# Patient Record
Sex: FEMALE | Race: WHITE | Employment: UNEMPLOYED | URBAN - METROPOLITAN AREA
[De-identification: names, ages, dates, MRNs, and addresses within clinical notes are randomized per-mention and may not be internally consistent; named-entity substitution may affect disease eponyms.]

---

## 2024-03-12 ENCOUNTER — APPOINTMENT (OUTPATIENT)
Dept: CT IMAGING | Age: 34
End: 2024-03-12
Payer: COMMERCIAL

## 2024-03-12 ENCOUNTER — HOSPITAL ENCOUNTER (EMERGENCY)
Age: 34
Discharge: HOME | End: 2024-03-12
Attending: EMERGENCY MEDICINE
Payer: COMMERCIAL

## 2024-03-12 VITALS
WEIGHT: 158 LBS | DIASTOLIC BLOOD PRESSURE: 78 MMHG | HEART RATE: 80 BPM | HEIGHT: 62 IN | TEMPERATURE: 98 F | RESPIRATION RATE: 16 BRPM | SYSTOLIC BLOOD PRESSURE: 119 MMHG | BODY MASS INDEX: 29.08 KG/M2 | OXYGEN SATURATION: 98 %

## 2024-03-12 DIAGNOSIS — R51.9 ACUTE NONINTRACTABLE HEADACHE, UNSPECIFIED HEADACHE TYPE: Primary | ICD-10-CM

## 2024-03-12 LAB
ALBUMIN SERPL-MCNC: 3.7 G/DL (ref 3.5–5.2)
ALP SERPL-CCNC: 77 U/L (ref 35–104)
ALT SERPL-CCNC: 18 U/L (ref 5–33)
ANION GAP SERPL CALCULATED.3IONS-SCNC: 8 MMOL/L (ref 9–17)
AST SERPL-CCNC: 23 U/L
BASOPHILS # BLD: <0.03 K/UL (ref 0–0.2)
BASOPHILS NFR BLD: 0 % (ref 0–2)
BILIRUB DIRECT SERPL-MCNC: <0.1 MG/DL
BILIRUB INDIRECT SERPL-MCNC: ABNORMAL MG/DL (ref 0–1)
BILIRUB SERPL-MCNC: 0.2 MG/DL (ref 0.3–1.2)
BUN SERPL-MCNC: 19 MG/DL (ref 6–20)
BUN/CREAT SERPL: 38 (ref 9–20)
CALCIUM SERPL-MCNC: 8.1 MG/DL (ref 8.6–10.4)
CHLORIDE SERPL-SCNC: 105 MMOL/L (ref 98–107)
CK SERPL-CCNC: 109 U/L (ref 26–192)
CO2 SERPL-SCNC: 25 MMOL/L (ref 20–31)
CREAT SERPL-MCNC: 0.5 MG/DL (ref 0.5–0.9)
EOSINOPHIL # BLD: 0.13 K/UL (ref 0–0.44)
EOSINOPHILS RELATIVE PERCENT: 3 % (ref 1–4)
ERYTHROCYTE [DISTWIDTH] IN BLOOD BY AUTOMATED COUNT: 18.5 % (ref 11.8–14.4)
FLUAV RNA RESP QL NAA+PROBE: NOT DETECTED
FLUBV RNA RESP QL NAA+PROBE: NOT DETECTED
GFR SERPL CREATININE-BSD FRML MDRD: >60 ML/MIN/1.73M2
GLUCOSE SERPL-MCNC: 92 MG/DL (ref 70–99)
HCG SERPL QL: NEGATIVE
HCT VFR BLD AUTO: 32.7 % (ref 36.3–47.1)
HGB BLD-MCNC: 9.5 G/DL (ref 11.9–15.1)
IMM GRANULOCYTES # BLD AUTO: 0.01 K/UL (ref 0–0.3)
IMM GRANULOCYTES NFR BLD: 0 %
LYMPHOCYTES NFR BLD: 1.85 K/UL (ref 1.1–3.7)
LYMPHOCYTES RELATIVE PERCENT: 39 % (ref 24–43)
MCH RBC QN AUTO: 23.5 PG (ref 25.2–33.5)
MCHC RBC AUTO-ENTMCNC: 29.1 G/DL (ref 28.4–34.8)
MCV RBC AUTO: 80.7 FL (ref 82.6–102.9)
MONOCYTES NFR BLD: 0.49 K/UL (ref 0.1–1.2)
MONOCYTES NFR BLD: 10 % (ref 3–12)
MYOGLOBIN SERPL-MCNC: <21 NG/ML (ref 25–58)
NEUTROPHILS NFR BLD: 48 % (ref 36–65)
NEUTS SEG NFR BLD: 2.2 K/UL (ref 1.5–8.1)
NRBC BLD-RTO: 0 PER 100 WBC
PLATELET # BLD AUTO: 355 K/UL (ref 138–453)
PMV BLD AUTO: 9 FL (ref 8.1–13.5)
POTASSIUM SERPL-SCNC: 4.1 MMOL/L (ref 3.7–5.3)
PROT SERPL-MCNC: 6.9 G/DL (ref 6.4–8.3)
RBC # BLD AUTO: 4.05 M/UL (ref 3.95–5.11)
RBC # BLD: ABNORMAL 10*6/UL
SARS-COV-2 RNA RESP QL NAA+PROBE: NOT DETECTED
SODIUM SERPL-SCNC: 138 MMOL/L (ref 135–144)
SOURCE: NORMAL
SPECIMEN DESCRIPTION: NORMAL
TSH SERPL DL<=0.05 MIU/L-ACNC: 1.49 UIU/ML (ref 0.3–5)
WBC OTHER # BLD: 4.7 K/UL (ref 3.5–11.3)

## 2024-03-12 PROCEDURE — 93005 ELECTROCARDIOGRAM TRACING: CPT | Performed by: EMERGENCY MEDICINE

## 2024-03-12 PROCEDURE — 80048 BASIC METABOLIC PNL TOTAL CA: CPT

## 2024-03-12 PROCEDURE — 80076 HEPATIC FUNCTION PANEL: CPT

## 2024-03-12 PROCEDURE — 87636 SARSCOV2 & INF A&B AMP PRB: CPT

## 2024-03-12 PROCEDURE — 84443 ASSAY THYROID STIM HORMONE: CPT

## 2024-03-12 PROCEDURE — 83874 ASSAY OF MYOGLOBIN: CPT

## 2024-03-12 PROCEDURE — 82550 ASSAY OF CK (CPK): CPT

## 2024-03-12 PROCEDURE — 6360000002 HC RX W HCPCS: Performed by: EMERGENCY MEDICINE

## 2024-03-12 PROCEDURE — 99284 EMERGENCY DEPT VISIT MOD MDM: CPT

## 2024-03-12 PROCEDURE — 70450 CT HEAD/BRAIN W/O DYE: CPT

## 2024-03-12 PROCEDURE — 84703 CHORIONIC GONADOTROPIN ASSAY: CPT

## 2024-03-12 PROCEDURE — 96374 THER/PROPH/DIAG INJ IV PUSH: CPT

## 2024-03-12 PROCEDURE — 85025 COMPLETE CBC W/AUTO DIFF WBC: CPT

## 2024-03-12 RX ORDER — KETOROLAC TROMETHAMINE 30 MG/ML
30 INJECTION, SOLUTION INTRAMUSCULAR; INTRAVENOUS ONCE
Status: COMPLETED | OUTPATIENT
Start: 2024-03-12 | End: 2024-03-12

## 2024-03-12 RX ORDER — PRAZOSIN HYDROCHLORIDE 2 MG/1
2 CAPSULE ORAL NIGHTLY
COMMUNITY

## 2024-03-12 RX ADMIN — KETOROLAC TROMETHAMINE 30 MG: 30 INJECTION, SOLUTION INTRAMUSCULAR at 11:58

## 2024-03-12 ASSESSMENT — ENCOUNTER SYMPTOMS
NAUSEA: 0
EYE REDNESS: 0
SORE THROAT: 0
RHINORRHEA: 0
VOMITING: 0
SHORTNESS OF BREATH: 0
EYE DISCHARGE: 0
COUGH: 0
COLOR CHANGE: 0
DIARRHEA: 0

## 2024-03-12 ASSESSMENT — PAIN DESCRIPTION - LOCATION: LOCATION: HEAD

## 2024-03-12 ASSESSMENT — PAIN DESCRIPTION - FREQUENCY: FREQUENCY: CONTINUOUS

## 2024-03-12 ASSESSMENT — PAIN DESCRIPTION - DESCRIPTORS: DESCRIPTORS: PRESSURE

## 2024-03-12 ASSESSMENT — PAIN - FUNCTIONAL ASSESSMENT: PAIN_FUNCTIONAL_ASSESSMENT: 0-10

## 2024-03-12 ASSESSMENT — PAIN SCALES - GENERAL: PAINLEVEL_OUTOF10: 7

## 2024-03-12 NOTE — ED PROVIDER NOTES
unless otherwise noted in MDM or here.  Reports below are reviewed by myself.  CT HEAD WO CONTRAST   Final Result   No acute intracranial findings.             LABS: Lab orders shown below, the results are reviewed by myself, and all abnormals are listed below.  Labs Reviewed   BASIC METABOLIC PANEL - Abnormal; Notable for the following components:       Result Value    Anion Gap 8 (*)     Bun/Cre Ratio 38 (*)     Calcium 8.1 (*)     All other components within normal limits   CBC WITH AUTO DIFFERENTIAL - Abnormal; Notable for the following components:    Hemoglobin 9.5 (*)     Hematocrit 32.7 (*)     MCV 80.7 (*)     MCH 23.5 (*)     RDW 18.5 (*)     All other components within normal limits   HEPATIC FUNCTION PANEL - Abnormal; Notable for the following components:    Total Bilirubin 0.2 (*)     All other components within normal limits   MYOGLOBIN, BLOOD - Abnormal; Notable for the following components:    Myoglobin <21 (*)     All other components within normal limits   COVID-19 & INFLUENZA COMBO   TSH WITH REFLEX   HCG, SERUM, QUALITATIVE   CK       Vitals Reviewed:    Vitals:    03/12/24 1001   BP: 119/78   Pulse: 80   Resp: 16   Temp: 98 °F (36.7 °C)   TempSrc: Oral   SpO2: 98%   Weight: 71.7 kg (158 lb)   Height: 1.575 m (5' 2\")     MEDICATIONS GIVEN TO PATIENT THIS ENCOUNTER:  Orders Placed This Encounter   Medications    ketorolac (TORADOL) injection 30 mg     DISCHARGE PRESCRIPTIONS:  New Prescriptions    No medications on file     PHYSICIAN CONSULTS ORDERED THIS ENCOUNTER:  None  FINAL IMPRESSION      1. Acute nonintractable headache, unspecified headache type          DISPOSITION/PLAN   DISPOSITION Decision To Discharge 03/12/2024 12:09:58 PM      OUTPATIENT FOLLOW UP THE PATIENT:  [facility]  [place] [facility] [address]  [phone]  Schedule an appointment as soon as possible for a visit in 2 days        MD Jayjay Birmingham Rory T, MD  03/12/24 4029

## 2024-03-12 NOTE — ED NOTES
Dr Seivlla notified patient wanted a follow up with a neurologist, D/C updated and follow information given to patient to follow up with neurologist.

## 2024-03-13 ENCOUNTER — OFFICE VISIT (OUTPATIENT)
Dept: NEUROSURGERY | Age: 34
End: 2024-03-13

## 2024-03-13 VITALS
HEART RATE: 78 BPM | SYSTOLIC BLOOD PRESSURE: 117 MMHG | HEIGHT: 62 IN | WEIGHT: 158 LBS | BODY MASS INDEX: 29.08 KG/M2 | DIASTOLIC BLOOD PRESSURE: 76 MMHG

## 2024-03-13 DIAGNOSIS — Z86.69 HISTORY OF CHIARI MALFORMATION: ICD-10-CM

## 2024-03-13 DIAGNOSIS — G93.5 CHIARI MALFORMATION TYPE I (HCC): Primary | ICD-10-CM

## 2024-03-13 LAB
EKG ATRIAL RATE: 76 BPM
EKG P AXIS: 67 DEGREES
EKG P-R INTERVAL: 144 MS
EKG Q-T INTERVAL: 400 MS
EKG QRS DURATION: 94 MS
EKG QTC CALCULATION (BAZETT): 450 MS
EKG R AXIS: 72 DEGREES
EKG T AXIS: 49 DEGREES
EKG VENTRICULAR RATE: 76 BPM

## 2024-03-13 NOTE — PROGRESS NOTES
demonstrate  no acute abnormality.     SOFT TISSUES/SKULL:  No acute abnormality of the visualized skull or soft  tissues.  There has been a prior occipital craniectomy.     IMPRESSION:  No acute intracranial findings.    Emergency Room Notes Reviewed      Assessment and Plan:      1. Chiari malformation type I (HCC)    2. History of Chiari malformation          Plan: Patient is having posterior headaches associated with straining. She has a history of Chiari malformation, with decompressive craniectomy in 2020.  The symptoms were brought on while she was weight lifting.  Cervical MRI and MRI CSF Flow study ordered.  Follow up with surgeon.      Followup: Return in about 4 weeks (around 4/10/2024), or if symptoms worsen or fail to improve.    Prescriptions Ordered:  No orders of the defined types were placed in this encounter.     Orders Placed:  Orders Placed This Encounter   Procedures    MRI CERVICAL SPINE WO CONTRAST     Standing Status:   Future     Standing Expiration Date:   3/13/2025    MRI CSF FLOW STUDY     Standing Status:   Future     Standing Expiration Date:   3/13/2025        Electronically signed by TOMASA Brown CNP on 3/13/2024 at 2:58 PM    Please note that this chart was generated using voice recognition Dragon dictation software.  Although every effort was made to ensure the accuracy of this automated transcription, some errors in transcription may have occurred.

## 2024-03-30 ENCOUNTER — HOSPITAL ENCOUNTER (OUTPATIENT)
Dept: MRI IMAGING | Age: 34
End: 2024-03-30
Payer: COMMERCIAL

## 2024-03-30 DIAGNOSIS — Z86.69 HISTORY OF CHIARI MALFORMATION: ICD-10-CM

## 2024-03-30 DIAGNOSIS — G93.5 CHIARI MALFORMATION TYPE I (HCC): ICD-10-CM

## 2024-03-30 PROCEDURE — 70551 MRI BRAIN STEM W/O DYE: CPT

## 2024-03-30 PROCEDURE — 72141 MRI NECK SPINE W/O DYE: CPT

## 2024-04-16 ENCOUNTER — HOSPITAL ENCOUNTER (EMERGENCY)
Age: 34
Discharge: HOME | End: 2024-04-16
Attending: EMERGENCY MEDICINE
Payer: COMMERCIAL

## 2024-04-16 VITALS
HEART RATE: 98 BPM | WEIGHT: 158 LBS | DIASTOLIC BLOOD PRESSURE: 64 MMHG | TEMPERATURE: 98.4 F | SYSTOLIC BLOOD PRESSURE: 117 MMHG | OXYGEN SATURATION: 98 % | BODY MASS INDEX: 28.9 KG/M2 | RESPIRATION RATE: 12 BRPM

## 2024-04-16 DIAGNOSIS — B34.9 VIRAL SYNDROME: Primary | ICD-10-CM

## 2024-04-16 LAB
ALBUMIN SERPL-MCNC: 3.9 G/DL (ref 3.5–5.2)
ALP SERPL-CCNC: 75 U/L (ref 35–104)
ALT SERPL-CCNC: 14 U/L (ref 5–33)
ANION GAP SERPL CALCULATED.3IONS-SCNC: 11 MMOL/L (ref 9–17)
AST SERPL-CCNC: 19 U/L
BASOPHILS # BLD: <0.03 K/UL (ref 0–0.2)
BASOPHILS NFR BLD: 0 % (ref 0–2)
BILIRUB SERPL-MCNC: 0.3 MG/DL (ref 0.3–1.2)
BUN SERPL-MCNC: 19 MG/DL (ref 6–20)
BUN/CREAT SERPL: 32 (ref 9–20)
CALCIUM SERPL-MCNC: 8.1 MG/DL (ref 8.6–10.4)
CHLORIDE SERPL-SCNC: 103 MMOL/L (ref 98–107)
CO2 SERPL-SCNC: 20 MMOL/L (ref 20–31)
CREAT SERPL-MCNC: 0.6 MG/DL (ref 0.5–0.9)
EOSINOPHIL # BLD: 0.03 K/UL (ref 0–0.44)
EOSINOPHILS RELATIVE PERCENT: 0 % (ref 1–4)
ERYTHROCYTE [DISTWIDTH] IN BLOOD BY AUTOMATED COUNT: 17.1 % (ref 11.8–14.4)
FLUAV RNA RESP QL NAA+PROBE: NOT DETECTED
FLUBV RNA RESP QL NAA+PROBE: NOT DETECTED
GFR SERPL CREATININE-BSD FRML MDRD: >90 ML/MIN/1.73M2
GLUCOSE SERPL-MCNC: 101 MG/DL (ref 70–99)
HCT VFR BLD AUTO: 35.4 % (ref 36.3–47.1)
HGB BLD-MCNC: 10.7 G/DL (ref 11.9–15.1)
IMM GRANULOCYTES # BLD AUTO: 0.02 K/UL (ref 0–0.3)
IMM GRANULOCYTES NFR BLD: 0 %
LYMPHOCYTES NFR BLD: 0.86 K/UL (ref 1.1–3.7)
LYMPHOCYTES RELATIVE PERCENT: 13 % (ref 24–43)
MCH RBC QN AUTO: 23.4 PG (ref 25.2–33.5)
MCHC RBC AUTO-ENTMCNC: 30.2 G/DL (ref 28.4–34.8)
MCV RBC AUTO: 77.5 FL (ref 82.6–102.9)
MONOCYTES NFR BLD: 0.45 K/UL (ref 0.1–1.2)
MONOCYTES NFR BLD: 7 % (ref 3–12)
NEUTROPHILS NFR BLD: 80 % (ref 36–65)
NEUTS SEG NFR BLD: 5.43 K/UL (ref 1.5–8.1)
NRBC BLD-RTO: 0 PER 100 WBC
PLATELET # BLD AUTO: 372 K/UL (ref 138–453)
PMV BLD AUTO: 9.1 FL (ref 8.1–13.5)
POTASSIUM SERPL-SCNC: 4.1 MMOL/L (ref 3.7–5.3)
PROT SERPL-MCNC: 7.5 G/DL (ref 6.4–8.3)
RBC # BLD AUTO: 4.57 M/UL (ref 3.95–5.11)
RBC # BLD: ABNORMAL 10*6/UL
SARS-COV-2 RNA RESP QL NAA+PROBE: NOT DETECTED
SODIUM SERPL-SCNC: 134 MMOL/L (ref 135–144)
SOURCE: NORMAL
SPECIMEN DESCRIPTION: NORMAL
WBC OTHER # BLD: 6.8 K/UL (ref 3.5–11.3)

## 2024-04-16 PROCEDURE — 87636 SARSCOV2 & INF A&B AMP PRB: CPT

## 2024-04-16 PROCEDURE — 99284 EMERGENCY DEPT VISIT MOD MDM: CPT

## 2024-04-16 PROCEDURE — 6370000000 HC RX 637 (ALT 250 FOR IP): Performed by: EMERGENCY MEDICINE

## 2024-04-16 PROCEDURE — 80053 COMPREHEN METABOLIC PANEL: CPT

## 2024-04-16 PROCEDURE — 96374 THER/PROPH/DIAG INJ IV PUSH: CPT

## 2024-04-16 PROCEDURE — 96375 TX/PRO/DX INJ NEW DRUG ADDON: CPT

## 2024-04-16 PROCEDURE — 85025 COMPLETE CBC W/AUTO DIFF WBC: CPT

## 2024-04-16 PROCEDURE — 2580000003 HC RX 258: Performed by: EMERGENCY MEDICINE

## 2024-04-16 PROCEDURE — 6360000002 HC RX W HCPCS: Performed by: EMERGENCY MEDICINE

## 2024-04-16 RX ORDER — ONDANSETRON 4 MG/1
4 TABLET, ORALLY DISINTEGRATING ORAL 3 TIMES DAILY PRN
Qty: 21 TABLET | Refills: 0 | Status: SHIPPED | OUTPATIENT
Start: 2024-04-16

## 2024-04-16 RX ORDER — LORAZEPAM 2 MG/ML
1 INJECTION INTRAMUSCULAR ONCE
Status: COMPLETED | OUTPATIENT
Start: 2024-04-16 | End: 2024-04-16

## 2024-04-16 RX ORDER — ONDANSETRON 2 MG/ML
4 INJECTION INTRAMUSCULAR; INTRAVENOUS ONCE
Status: COMPLETED | OUTPATIENT
Start: 2024-04-16 | End: 2024-04-16

## 2024-04-16 RX ORDER — IBUPROFEN 600 MG/1
600 TABLET ORAL 3 TIMES DAILY PRN
Qty: 30 TABLET | Refills: 0 | Status: SHIPPED | OUTPATIENT
Start: 2024-04-16

## 2024-04-16 RX ORDER — LOPERAMIDE HYDROCHLORIDE 2 MG/1
2 CAPSULE ORAL ONCE
Status: COMPLETED | OUTPATIENT
Start: 2024-04-16 | End: 2024-04-16

## 2024-04-16 RX ORDER — METOCLOPRAMIDE HYDROCHLORIDE 5 MG/ML
10 INJECTION INTRAMUSCULAR; INTRAVENOUS ONCE
Status: COMPLETED | OUTPATIENT
Start: 2024-04-16 | End: 2024-04-16

## 2024-04-16 RX ORDER — KETOROLAC TROMETHAMINE 15 MG/ML
15 INJECTION, SOLUTION INTRAMUSCULAR; INTRAVENOUS ONCE
Status: COMPLETED | OUTPATIENT
Start: 2024-04-16 | End: 2024-04-16

## 2024-04-16 RX ORDER — 0.9 % SODIUM CHLORIDE 0.9 %
1000 INTRAVENOUS SOLUTION INTRAVENOUS ONCE
Status: COMPLETED | OUTPATIENT
Start: 2024-04-16 | End: 2024-04-16

## 2024-04-16 RX ORDER — LOPERAMIDE HYDROCHLORIDE 2 MG/1
2 CAPSULE ORAL 4 TIMES DAILY PRN
Qty: 30 CAPSULE | Refills: 0 | Status: SHIPPED | OUTPATIENT
Start: 2024-04-16 | End: 2024-04-26

## 2024-04-16 RX ADMIN — HYOSCYAMINE SULFATE 250 MCG: 0.12 TABLET ORAL; SUBLINGUAL at 09:08

## 2024-04-16 RX ADMIN — LORAZEPAM 1 MG: 2 INJECTION INTRAMUSCULAR; INTRAVENOUS at 08:49

## 2024-04-16 RX ADMIN — KETOROLAC TROMETHAMINE 15 MG: 15 INJECTION, SOLUTION INTRAMUSCULAR; INTRAVENOUS at 07:46

## 2024-04-16 RX ADMIN — METOCLOPRAMIDE HYDROCHLORIDE 10 MG: 5 INJECTION INTRAMUSCULAR; INTRAVENOUS at 07:47

## 2024-04-16 RX ADMIN — LOPERAMIDE HYDROCHLORIDE 2 MG: 2 CAPSULE ORAL at 10:03

## 2024-04-16 RX ADMIN — SODIUM CHLORIDE 1000 ML: 9 INJECTION, SOLUTION INTRAVENOUS at 07:46

## 2024-04-16 RX ADMIN — ONDANSETRON 4 MG: 2 INJECTION INTRAMUSCULAR; INTRAVENOUS at 09:47

## 2024-04-16 ASSESSMENT — PAIN SCALES - GENERAL
PAINLEVEL_OUTOF10: 4
PAINLEVEL_OUTOF10: 4
PAINLEVEL_OUTOF10: 3

## 2024-04-16 ASSESSMENT — PAIN - FUNCTIONAL ASSESSMENT: PAIN_FUNCTIONAL_ASSESSMENT: 0-10

## 2024-04-16 NOTE — DISCHARGE INSTRUCTIONS
Keep yourself well-hydrated, use ibuprofen as needed for fevers and pain, loperamide as needed for diarrhea, ondansetron as needed for nausea/vomiting.  If you have severe worsening of your symptoms or any new concerning symptoms come back to the ER.  Follow-up with your primary care provider.

## 2024-04-16 NOTE — ED PROVIDER NOTES
flu swab  - 1 L NS bolus  - IV toradol  - IV reglan  - IV ativan  - IV zofran  - po levsin  - po imodium    33-year-old female presents to the ED for several days of fatigue, body aches, abdominal cramping, nausea/vomiting etc.  On presentation patient appears fatigued, vitals otherwise stable, no concerning findings on exam.  Differential diagnosis includes viral syndrome, metabolic derangement.  Labs without acute findings, patient required multiple medications as above for symptom control but eventually felt better in the ED.  Impression is viral syndrome, low concern for acute life-threatening process, patient was given instructions for supportive care, provided prescriptions for ibuprofen, Imodium, Zofran, she was discharged in the ED in stable condition with instructions to follow-up with PCP.    DIAGNOSTIC RESULTS       LABS: All lab results were reviewed by myself, and all abnormals are listed below.  Labs Reviewed   CBC WITH AUTO DIFFERENTIAL - Abnormal; Notable for the following components:       Result Value    Hemoglobin 10.7 (*)     Hematocrit 35.4 (*)     MCV 77.5 (*)     MCH 23.4 (*)     RDW 17.1 (*)     Neutrophils % 80 (*)     Lymphocytes % 13 (*)     Eosinophils % 0 (*)     Lymphocytes Absolute 0.86 (*)     All other components within normal limits   COMPREHENSIVE METABOLIC PANEL - Abnormal; Notable for the following components:    Sodium 134 (*)     Glucose 101 (*)     Bun/Cre Ratio 32 (*)     Calcium 8.1 (*)     All other components within normal limits   COVID-19 & INFLUENZA COMBO                Vitals:    Vitals:    04/16/24 0713 04/16/24 0917   BP: 117/64    Pulse: 98    Resp: 12    Temp: 98.4 °F (36.9 °C)    TempSrc: Oral    SpO2: 98%    Weight: 117.9 kg (260 lb) 71.7 kg (158 lb)       The patient was given the following medications while in the emergency department:  Orders Placed This Encounter   Medications    sodium chloride 0.9 % bolus 1,000 mL    metoclopramide (REGLAN) injection 10 mg

## 2024-04-24 ENCOUNTER — OFFICE VISIT (OUTPATIENT)
Dept: NEUROSURGERY | Age: 34
End: 2024-04-24
Payer: COMMERCIAL

## 2024-04-24 VITALS
WEIGHT: 150.8 LBS | SYSTOLIC BLOOD PRESSURE: 100 MMHG | DIASTOLIC BLOOD PRESSURE: 67 MMHG | HEIGHT: 62 IN | HEART RATE: 76 BPM | BODY MASS INDEX: 27.75 KG/M2

## 2024-04-24 DIAGNOSIS — G93.5 CHIARI MALFORMATION TYPE I (HCC): Primary | ICD-10-CM

## 2024-04-24 PROCEDURE — 1036F TOBACCO NON-USER: CPT | Performed by: NEUROLOGICAL SURGERY

## 2024-04-24 PROCEDURE — 99203 OFFICE O/P NEW LOW 30 MIN: CPT | Performed by: NEUROLOGICAL SURGERY

## 2024-04-24 PROCEDURE — G8427 DOCREV CUR MEDS BY ELIG CLIN: HCPCS | Performed by: NEUROLOGICAL SURGERY

## 2024-04-24 PROCEDURE — G8419 CALC BMI OUT NRM PARAM NOF/U: HCPCS | Performed by: NEUROLOGICAL SURGERY

## 2024-04-24 NOTE — PROGRESS NOTES
[facility] NEUROSURGERY CENTER [place] [id number] [facility]  [id number]  [address]  [facility] [address]  Dept: [phone]    Patient:  Laura Lopez  YOB: 1990  Date: 24    The patient is a 33 y.o. female who presents today for consult of the following problems:     Chief Complaint   Patient presents with    Follow-up             HPI:     Laura Lopez is a 33 y.o. female on whom neurosurgical consultation was requested by No primary care provider on file. for management of Chiari malformation.  Patient has undergone Chiari decompression with expansion duraplasty approximately 4 years ago.  Patient states that she did get significant proved with no verbal cell related headaches immediately after the procedure.  Unfortunately has a more recent she is having multitude of issues including worsening of headaches mainly bitemporal right greater than left with any kind of coughing sneezing or bearing down.  In addition she does note achiness in her legs as well as numbness throughout her body when she is laying prone.  Notes a significant arm pain in the interscapular region as well as the base of the neck.  This appears to occur with head rotation as well as forward flexion with a tightness.      History:     Past Medical History:   Diagnosis Date    Anxiety     Depression     MRSA (methicillin resistant Staphylococcus aureus)      Past Surgical History:   Procedure Laterality Date    ABSCESS DRAINAGE      BRAIN SURGERY       SECTION      CHOLECYSTECTOMY       No family history on file.  Current Outpatient Medications on File Prior to Visit   Medication Sig Dispense Refill    ondansetron (ZOFRAN-ODT) 4 MG disintegrating tablet Take 1 tablet by mouth 3 times daily as needed for Nausea or Vomiting 21 tablet 0    loperamide (IMODIUM) 2 MG capsule Take 1 capsule by mouth 4 times daily as needed for Diarrhea 30 capsule 0

## 2024-06-04 ENCOUNTER — HOSPITAL ENCOUNTER (EMERGENCY)
Age: 34
Discharge: HOME | End: 2024-06-04
Attending: STUDENT IN AN ORGANIZED HEALTH CARE EDUCATION/TRAINING PROGRAM
Payer: COMMERCIAL

## 2024-06-04 VITALS
RESPIRATION RATE: 16 BRPM | HEART RATE: 80 BPM | WEIGHT: 150 LBS | HEIGHT: 62 IN | DIASTOLIC BLOOD PRESSURE: 80 MMHG | BODY MASS INDEX: 27.6 KG/M2 | TEMPERATURE: 97.9 F | SYSTOLIC BLOOD PRESSURE: 122 MMHG | OXYGEN SATURATION: 100 %

## 2024-06-04 DIAGNOSIS — R51.9 NONINTRACTABLE HEADACHE, UNSPECIFIED CHRONICITY PATTERN, UNSPECIFIED HEADACHE TYPE: Primary | ICD-10-CM

## 2024-06-04 PROCEDURE — 99284 EMERGENCY DEPT VISIT MOD MDM: CPT

## 2024-06-04 PROCEDURE — 6370000000 HC RX 637 (ALT 250 FOR IP): Performed by: STUDENT IN AN ORGANIZED HEALTH CARE EDUCATION/TRAINING PROGRAM

## 2024-06-04 PROCEDURE — 6360000002 HC RX W HCPCS: Performed by: STUDENT IN AN ORGANIZED HEALTH CARE EDUCATION/TRAINING PROGRAM

## 2024-06-04 PROCEDURE — 96372 THER/PROPH/DIAG INJ SC/IM: CPT

## 2024-06-04 RX ORDER — METOCLOPRAMIDE HYDROCHLORIDE 5 MG/ML
10 INJECTION INTRAMUSCULAR; INTRAVENOUS ONCE
Status: COMPLETED | OUTPATIENT
Start: 2024-06-04 | End: 2024-06-04

## 2024-06-04 RX ORDER — DIPHENHYDRAMINE HYDROCHLORIDE 50 MG/ML
25 INJECTION INTRAMUSCULAR; INTRAVENOUS ONCE
Status: COMPLETED | OUTPATIENT
Start: 2024-06-04 | End: 2024-06-04

## 2024-06-04 RX ORDER — BUTALBITAL, ACETAMINOPHEN AND CAFFEINE 50; 325; 40 MG/1; MG/1; MG/1
1 TABLET ORAL EVERY 4 HOURS PRN
Qty: 30 TABLET | Refills: 0 | Status: SHIPPED | OUTPATIENT
Start: 2024-06-04

## 2024-06-04 RX ORDER — BUTALBITAL, ACETAMINOPHEN AND CAFFEINE 50; 325; 40 MG/1; MG/1; MG/1
1 TABLET ORAL ONCE
Status: COMPLETED | OUTPATIENT
Start: 2024-06-04 | End: 2024-06-04

## 2024-06-04 RX ORDER — KETOROLAC TROMETHAMINE 30 MG/ML
30 INJECTION, SOLUTION INTRAMUSCULAR; INTRAVENOUS ONCE
Status: COMPLETED | OUTPATIENT
Start: 2024-06-04 | End: 2024-06-04

## 2024-06-04 RX ADMIN — KETOROLAC TROMETHAMINE 30 MG: 30 INJECTION, SOLUTION INTRAMUSCULAR at 02:41

## 2024-06-04 RX ADMIN — DIPHENHYDRAMINE HYDROCHLORIDE 25 MG: 50 INJECTION INTRAMUSCULAR; INTRAVENOUS at 02:41

## 2024-06-04 RX ADMIN — METOCLOPRAMIDE HYDROCHLORIDE 10 MG: 5 INJECTION INTRAMUSCULAR; INTRAVENOUS at 02:42

## 2024-06-04 RX ADMIN — BUTALBITAL, ACETAMINOPHEN, AND CAFFEINE 1 TABLET: 50; 325; 40 TABLET ORAL at 02:41

## 2024-06-04 ASSESSMENT — PAIN - FUNCTIONAL ASSESSMENT: PAIN_FUNCTIONAL_ASSESSMENT: 0-10

## 2024-06-04 ASSESSMENT — PAIN SCALES - GENERAL
PAINLEVEL_OUTOF10: 10
PAINLEVEL_OUTOF10: 7

## 2024-06-04 NOTE — ED PROVIDER NOTES
[facility] EMERGENCY DEPARTMENT ENCOUNTER      Pt Name: Laura Lopez  MRN: [id number]  Birthdate 1990  Date of evaluation: 24    CHIEF COMPLAINT       Chief Complaint   Patient presents with    Migraine     \"Started x3 days ago, typically lessens with ibuprofen but it's not even touching it.\"       HISTORY OF PRESENT ILLNESS   Laura Lopez is a 34 y.o. female who presents with headache.  Has a previous history of Arnold-Chiari syndrome with previous decompression and occipital craniectomy.  States that since then she gets frequent headaches.  Feels like a typical migraine.  Gradual in onset.  Ongoing for the past 3 days.  Has attempted ibuprofen with minimal relief at home.    PASTMEDICAL HISTORY     Past Medical History:   Diagnosis Date    Anxiety     Depression     MRSA (methicillin resistant Staphylococcus aureus)      Past Problem List  There is no problem list on file for this patient.      SURGICAL HISTORY       Past Surgical History:   Procedure Laterality Date    ABSCESS DRAINAGE      BRAIN SURGERY       SECTION      CHOLECYSTECTOMY         CURRENT MEDICATIONS       Discharge Medication List as of 2024  2:59 AM        CONTINUE these medications which have NOT CHANGED    Details   ondansetron (ZOFRAN-ODT) 4 MG disintegrating tablet Take 1 tablet by mouth 3 times daily as needed for Nausea or Vomiting, Disp-21 tablet, R-0Normal      ibuprofen (ADVIL;MOTRIN) 600 MG tablet Take 1 tablet by mouth 3 times daily as needed for Pain, Disp-30 tablet, R-0Normal      prazosin (MINIPRESS) 2 MG capsule Take 1 capsule by mouth nightlyHistorical Med      Mirtazapine (REMERON PO) Take 7.5 mg by mouth nightlyHistorical Med      busPIRone HCl (BUSPAR PO) Take 10 mg by mouth daily as neededHistorical Med      buPROPion HCl (WELLBUTRIN XL PO) Take 7.5 mg by mouth dailyHistorical Med             ALLERGIES     is allergic to pyridium [phenazopyridine].    FAMILY HISTORY     has no family status

## 2024-06-04 NOTE — ED NOTES
Patient arrived to ED with c/o ongoing migraine. Patient reported she has had an ongoing migraine for three days with no relief with PO Ibuprofen. Patient reported when she takes PO Ibuprofen, her headache or migraine is alleviated. However she has had no relief with PO medications. Patient reported she has had nausea due to her pain and discomfort with no episodes of vomiting. Patient reports denies SOB, her respirations are equal and non-labored with no use of accessory muscles. Patient denies chest pain.     Call light within reach, warm blanket provided for comfort.